# Patient Record
Sex: FEMALE | URBAN - METROPOLITAN AREA
[De-identification: names, ages, dates, MRNs, and addresses within clinical notes are randomized per-mention and may not be internally consistent; named-entity substitution may affect disease eponyms.]

---

## 2021-09-02 ENCOUNTER — APPOINTMENT (RX ONLY)
Age: 36
Setting detail: DERMATOLOGY
End: 2021-09-02

## 2021-09-02 DIAGNOSIS — L72.0 EPIDERMAL CYST: ICD-10-CM

## 2021-09-02 DIAGNOSIS — L70.0 ACNE VULGARIS: ICD-10-CM

## 2021-09-02 PROBLEM — D48.5 NEOPLASM OF UNCERTAIN BEHAVIOR OF SKIN: Status: ACTIVE | Noted: 2021-09-02

## 2021-09-02 PROCEDURE — ? BIOPSY BY PUNCH METHOD

## 2021-09-02 PROCEDURE — 11104 PUNCH BX SKIN SINGLE LESION: CPT

## 2021-09-02 PROCEDURE — ? PRESCRIPTION

## 2021-09-02 PROCEDURE — 99203 OFFICE O/P NEW LOW 30 MIN: CPT | Mod: 25

## 2021-09-02 RX ORDER — TRETIONIN 0.25 MG/G
CREAM TOPICAL QHS
Qty: 20 | Refills: 5 | Status: ERX | COMMUNITY
Start: 2021-09-02

## 2021-09-02 RX ADMIN — TRETIONIN: 0.25 CREAM TOPICAL at 00:00

## 2021-09-02 ASSESSMENT — LOCATION SIMPLE DESCRIPTION DERM: LOCATION SIMPLE: ABDOMEN

## 2021-09-02 ASSESSMENT — LOCATION DETAILED DESCRIPTION DERM: LOCATION DETAILED: SUBXIPHOID

## 2021-09-02 ASSESSMENT — LOCATION ZONE DERM: LOCATION ZONE: TRUNK

## 2021-09-02 NOTE — PROCEDURE: BIOPSY BY PUNCH METHOD
Epidermal Sutures: none, closed by secondary intention
Validate Anticipated Plan: No
Post-Care Instructions: Today you had a skin biopsy done at [facility].\\n\\nLeave the bandage in place for 24 hours. After 24 hours, remove the bandage and clean with gentle soap and water. Please clean the site with soap and water at least daily. Apply Vaseline or Aquaphor ointment to the site at least once or twice daily.\\n\\nAfter the first 24 hours, you may leave the site uncovered or, if you choose, you may cover it with a new bandage. \\n\\nIf a scab develops over the biopsy site, please do not pick it off. \\n \\nPlease keep the area protected from theh sun with sun screen or protective clothing, such as a hat. \\n\\nPlease return to the office to have the sutures removed in 10 days.\\n\\nIf you develop increasing pain, redness, or drainage at the site, or any other concerns, please contact us.\\n\\nYou should receive your biopsy results generally within 7 to 14 days (either at a return visit or by phone). If you do not receive your results within 2 weeks, it is important that you contact us.
Additional Anesthesia Volume In Cc (Will Not Render If 0): 0
Validate Note Data (See Information Below): Yes
Wound Care: Aquaphor
Anesthesia Volume In Cc: 4
Billing Type: Third-Party Bill
Consent: Verbal consent was obtained and risks were reviewed including but not limited to scarring, infection, bleeding, incomplete removal, nerve damage and allergy to anesthesia.
Information: Selecting Yes will display possible errors in your note based on the variables you have selected. This validation is only offered as a suggestion for you. PLEASE NOTE THAT THE VALIDATION TEXT WILL BE REMOVED WHEN YOU FINALIZE YOUR NOTE. IF YOU WANT TO FAX A PRELIMINARY NOTE YOU WILL NEED TO TOGGLE THIS TO 'NO' IF YOU DO NOT WANT IT IN YOUR FAXED NOTE.
Path Notes (To The Dermat[facility]): .
Anesthesia Type: 1% lidocaine with epinephrine
Detail Level: Detailed
Notification Instructions: Patient will be notified of biopsy results. However, patient instructed to call the office if not contacted within 2 weeks.
Hemostasis: None
Biopsy Type: H and E
Dressing: pressure dressing with telfa
Punch Size In Mm: 3

## 2021-09-27 ENCOUNTER — APPOINTMENT (RX ONLY)
Age: 36
Setting detail: DERMATOLOGY
End: 2021-09-27

## 2021-09-27 DIAGNOSIS — L91.0 HYPERTROPHIC SCAR: ICD-10-CM

## 2021-09-27 DIAGNOSIS — L72.8 OTHER FOLLICULAR CYSTS OF THE SKIN AND SUBCUTANEOUS TISSUE: ICD-10-CM

## 2021-09-27 PROBLEM — D48.5 NEOPLASM OF UNCERTAIN BEHAVIOR OF SKIN: Status: ACTIVE | Noted: 2021-09-27

## 2021-09-27 PROCEDURE — ? INTRALESIONAL KENALOG

## 2021-09-27 PROCEDURE — 11104 PUNCH BX SKIN SINGLE LESION: CPT

## 2021-09-27 PROCEDURE — ? BIOPSY BY PUNCH METHOD

## 2021-09-27 PROCEDURE — 10060 I&D ABSCESS SIMPLE/SINGLE: CPT

## 2021-09-27 PROCEDURE — ? COUNSELING

## 2021-09-27 PROCEDURE — ? BENIGN DESTRUCTION

## 2021-09-27 PROCEDURE — ? INCISION AND DRAINAGE

## 2021-09-27 PROCEDURE — 17110 DESTRUCTION B9 LES UP TO 14: CPT | Mod: 59

## 2021-09-27 PROCEDURE — ? PATIENT SPECIFIC COUNSELING

## 2021-09-27 ASSESSMENT — LOCATION DETAILED DESCRIPTION DERM
LOCATION DETAILED: EPIGASTRIC SKIN
LOCATION DETAILED: SUBXIPHOID
LOCATION DETAILED: INFERIOR MID FOREHEAD
LOCATION DETAILED: LEFT LATERAL FOREHEAD
LOCATION DETAILED: SUPERIOR MID FOREHEAD

## 2021-09-27 ASSESSMENT — LOCATION SIMPLE DESCRIPTION DERM
LOCATION SIMPLE: INFERIOR FOREHEAD
LOCATION SIMPLE: LEFT FOREHEAD
LOCATION SIMPLE: SUPERIOR FOREHEAD
LOCATION SIMPLE: ABDOMEN

## 2021-09-27 ASSESSMENT — LOCATION ZONE DERM
LOCATION ZONE: FACE
LOCATION ZONE: TRUNK

## 2021-09-27 NOTE — PROCEDURE: BENIGN DESTRUCTION
Render Note In Bullet Format When Appropriate: No
Total Number Of Lesions Treated: 12
Consent: The patient's consent was obtained including but not limited to risks of crusting, scabbing, blistering, scarring, darker or lighter pigmentary change, recurrence, incomplete removal and infection.
Detail Level: Zone
Anesthesia Volume In Cc: 0
Post-Care Instructions: I reviewed with the patient in detail post-care instructions. Patient is to wear sunprotection, and avoid picking at any of the treated lesions. Pt may apply Vaseline to crusted or scabbing areas.
Medical Necessity Clause: A total of 5 spots were performed on
Medical Necessity Information: It is in your best interest to select a reason for this procedure from the list below. All of these items fulfill various CMS LCD requirements except the new and changing color options.

## 2021-09-27 NOTE — PROCEDURE: PATIENT SPECIFIC COUNSELING
Detail Level: Zone
** I've instructed the pt if insurance won't cover the treatment, the charge will be $150.

## 2021-09-27 NOTE — PROCEDURE: INTRALESIONAL KENALOG
Detail Level: Detailed
Lot # (Optional): [id number]
X Size Of Lesion In Cm (Optional): 0.6
Medical Necessity Clause: This procedure was medically necessary because the lesions that were treated were:
Concentration (Mg/Ml): 20.0
Ndc# (Optional): [address]-2
Total Volume (Ccs- Only Use Numbers And Decimals): 0.1
Expiration Date (Optional): 11/2022
Consent: The risks of atrophy were reviewed with the patient.
Kenalog Preparation: Kenalog with 1% lidocaine without epinephrine
Include Z78.9 (Other Specified Conditions Influencing Health Status) As An Associated Diagnosis?: No

## 2021-09-27 NOTE — PROCEDURE: BIOPSY BY PUNCH METHOD
Additional Anesthesia Volume In Cc (Will Not Render If 0): 0
Home Suture Removal Text: .
Hemostasis: None
Anesthesia Volume In Cc: 4
Epidermal Sutures: 5-0 Surgipro
Patient Will Remove Sutures At Home?: No
Render Post-Care Instructions In Note?: yes
Anesthesia Type: 1% lidocaine with epinephrine and a 1:10 solution of 8.4% sodium bicarbonate
Billing Type: Third-Party Bill
Biopsy Type: H and E
Punch Size In Mm: 3
Dressing: no dressing applied
Information: Selecting Yes will display possible errors in your note based on the variables you have selected. This validation is only offered as a suggestion for you. PLEASE NOTE THAT THE VALIDATION TEXT WILL BE REMOVED WHEN YOU FINALIZE YOUR NOTE. IF YOU WANT TO FAX A PRELIMINARY NOTE YOU WILL NEED TO TOGGLE THIS TO 'NO' IF YOU DO NOT WANT IT IN YOUR FAXED NOTE.
Consent: Verbal consent was obtained and risks were reviewed including but not limited to scarring, infection, bleeding, incomplete removal, nerve damage and allergy to anesthesia.
Notification Instructions: Patient will be notified of biopsy results. However, patient instructed to call the office if not contacted within 2 weeks.
Detail Level: Detailed
Wound Care: Aquaphor
Post-Care Instructions: Today you had a skin biopsy done at [facility].\\n\\nLeave the bandage in place for 24 hours. After 24 hours, remove the bandage and clean with gentle soap and water. Please clean the site with soap and water at least daily. Apply Vaseline or Aquaphor ointment to the site at least once or twice daily.\\n\\nAfter the first 24 hours, you may leave the site uncovered or, if you choose, you may cover it with a new bandage. \\n\\nIf a scab develops over the biopsy site, please do not pick it off. \\n \\nPlease keep the area protected from theh sun with sun screen or protective clothing, such as a hat. \\n\\nPlease return to the office to have the sutures removed in 10 days.\\n\\nIf you develop increasing pain, redness, or drainage at the site, or any other concerns, please contact us.\\n\\nYou should receive your biopsy results generally within 7 to 14 days (either at a return visit or by phone). If you do not receive your results within 2 weeks, it is important that you contact us.

## 2021-09-27 NOTE — PROCEDURE: INCISION AND DRAINAGE
Dressing: no dressing
Lesion Type: Blister
Epidermal Closure: simple interrupted
Preparation Text: The area was prepped with chlorhexidine scrub.
Method: sterile needle
Post-Care Instructions: After Incision & Drainage of an abscess\\n\\nAn abscess (sometimes called a “boil”) occurs when bacteria get trapped under the skin and begin to grow. Pus forms inside the abscess as the body responds to the bacteria. An abscess can occur with an insect bite, ingrown hair, blocked oil gland, pimple, cyst, or puncture wound.\\nTreatment of your abscess has required an incision to drain the pus. If the abscess pocket was large, gauze packing may have been inserted. This will need to be removed and possibly replaced on your next visit. Antibiotics are not required in the treatment of a simple abscess, unless the infection is spreading into the skin around the wound (known as “cellulitis”).\\nHealing of the wound will take about one to two weeks depending on the size of the abscess. Healthy tissue will grow from the bottom and sides of the opening until it seals over.\\n\\nHome Care:\\nThe wound may drain for the first two days. Cover the wound with a clean dry dressing. If the dressing becomes soaked with blood or pus, change it.\\nIf a gauze packing was placed inside the abscess cavity, you may be advised to remove it yourself (only if instructed by staff) You may do this in the shower. Once the packing is removed, you should wash the area in the shower or bath 3 to 4 times a day, until the skin opening has closed.\\nIf you were prescribed antibiotics, take them as directed until they are all gone.\\nYou may use acetaminophen (Tylenol) or ibuprofen (Motrin, Advil) to control pain, unless another pain medicine was prescribed. \\n***NOTE: If you have liver disease or ever had a stomach ulcer, talk with your doctor before using these medicines.\\n\\nFollow Up:\\nFollow up in our clinic as advised by our staff. If a gauze packing was inserted in your wound, it should be removed in 1-3 days. Check your wound every day for the signs of worsening (see list below).\\nGet prompt medical attention if any of the following occur:\\nIncreasing redness or swelling\\nRed streaks in the skin leading away from the wound\\nIncreasing local pain or swelling\\nContinued pus draining from the wound two days after treatment\\nFever of 100.4ºF (38ºC) or higher, or as directed by your healthcare provider
Size Of Lesion In Cm (Optional But May Be Required For Some Insurances): 0
Include Sutures?: No
Detail Level: Detailed
Suture Text: The incision was partially closed with
Consent was obtained after risks were reviewed including but not limited to delayed wound healing, infection, need for multiple I and D's, and pain.
Epidermal Sutures: 4-0 Ethilon

## 2021-10-07 ENCOUNTER — APPOINTMENT (RX ONLY)
Age: 36
Setting detail: DERMATOLOGY
End: 2021-10-07

## 2021-10-07 DIAGNOSIS — Z48.00 ENCOUNTER FOR CHANGE OR REMOVAL OF NONSURGICAL WOUND DRESSING: ICD-10-CM

## 2021-10-07 PROCEDURE — 99211 OFF/OP EST MAY X REQ PHY/QHP: CPT | Mod: 24

## 2021-10-07 PROCEDURE — ? SUTURE REMOVAL (NO GLOBAL PERIOD)

## 2021-10-07 ASSESSMENT — LOCATION DETAILED DESCRIPTION DERM: LOCATION DETAILED: SUBXIPHOID

## 2021-10-07 ASSESSMENT — LOCATION SIMPLE DESCRIPTION DERM: LOCATION SIMPLE: ABDOMEN

## 2021-10-07 ASSESSMENT — LOCATION ZONE DERM: LOCATION ZONE: TRUNK

## 2021-11-03 ENCOUNTER — APPOINTMENT (RX ONLY)
Age: 36
Setting detail: DERMATOLOGY
End: 2021-11-03

## 2021-11-03 DIAGNOSIS — L72.3 SEBACEOUS CYST: ICD-10-CM

## 2021-11-03 DIAGNOSIS — L72.0 EPIDERMAL CYST: ICD-10-CM

## 2021-11-03 PROCEDURE — ? COSMETIC EXTRACTIONS

## 2021-11-03 PROCEDURE — 99212 OFFICE O/P EST SF 10 MIN: CPT

## 2021-11-03 PROCEDURE — ? COUNSELING

## 2021-11-03 PROCEDURE — ? DIAGNOSIS COMMENT

## 2021-11-03 ASSESSMENT — LOCATION ZONE DERM: LOCATION ZONE: FACE

## 2021-11-03 ASSESSMENT — LOCATION DETAILED DESCRIPTION DERM: LOCATION DETAILED: LEFT MEDIAL FOREHEAD

## 2021-11-03 ASSESSMENT — LOCATION SIMPLE DESCRIPTION DERM: LOCATION SIMPLE: LEFT FOREHEAD

## 2021-11-03 NOTE — HPI: EVALUATION OF SKIN LESION(S)
Hpi Title: Evaluation of Skin Lesions
Additional History: When asked, patient reports she eats mainly green leafy vegetables. She does not have a restrictive diet.

## 2021-11-03 NOTE — PROCEDURE: DIAGNOSIS COMMENT
Detail Level: Simple
Render Risk Assessment In Note?: yes
Comment: She has not responded well to physical extractions.  I recommended she see a cosmetic dermatologist and I sent a referral letter to Dr. Ahuja.

## 2021-11-03 NOTE — PROCEDURE: COSMETIC EXTRACTIONS
Post-Care Instructions: I reviewed with the patient in detail post-care instructions. Patient is to wear sunprotection, and avoid picking at any of the treated lesions. Pt may apply Vaseline to crusted or scabbing areas.
Consent: The patient's consent was obtained including but not limited to risks of crusting, scabbing, blistering, scarring, darker or lighter pigmentary change, recurrence, incomplete removal and infection.
Price (Use Numbers Only, No Special Characters Or $): 0.00
Detail Level: Detailed
Render The Number Of Extractions: Yes
Anesthesia Volume In Cc: 0